# Patient Record
Sex: MALE | Race: WHITE | NOT HISPANIC OR LATINO | URBAN - METROPOLITAN AREA
[De-identification: names, ages, dates, MRNs, and addresses within clinical notes are randomized per-mention and may not be internally consistent; named-entity substitution may affect disease eponyms.]

---

## 2024-05-19 ENCOUNTER — HOSPITAL ENCOUNTER (EMERGENCY)
Facility: HOSPITAL | Age: 74
Discharge: HOME | End: 2024-05-19
Attending: EMERGENCY MEDICINE
Payer: OTHER GOVERNMENT

## 2024-05-19 VITALS
HEIGHT: 69 IN | HEART RATE: 84 BPM | BODY MASS INDEX: 28.14 KG/M2 | TEMPERATURE: 98 F | OXYGEN SATURATION: 95 % | DIASTOLIC BLOOD PRESSURE: 82 MMHG | WEIGHT: 190 LBS | RESPIRATION RATE: 20 BRPM | SYSTOLIC BLOOD PRESSURE: 156 MMHG

## 2024-05-19 DIAGNOSIS — M54.2 NECK PAIN: Primary | ICD-10-CM

## 2024-05-19 PROCEDURE — 99285 EMERGENCY DEPT VISIT HI MDM: CPT | Mod: 25

## 2024-05-19 PROCEDURE — 25000003 PHARM REV CODE 250: Performed by: EMERGENCY MEDICINE

## 2024-05-19 RX ORDER — MELOXICAM 15 MG/1
15 TABLET ORAL DAILY
Qty: 30 TABLET | Refills: 0 | Status: SHIPPED | OUTPATIENT
Start: 2024-05-19

## 2024-05-19 RX ORDER — METHOCARBAMOL 500 MG/1
1000 TABLET, FILM COATED ORAL 3 TIMES DAILY
Qty: 30 TABLET | Refills: 0 | Status: SHIPPED | OUTPATIENT
Start: 2024-05-19 | End: 2024-05-24

## 2024-05-19 RX ORDER — HYDROCODONE BITARTRATE AND ACETAMINOPHEN 5; 325 MG/1; MG/1
1 TABLET ORAL
Status: COMPLETED | OUTPATIENT
Start: 2024-05-19 | End: 2024-05-19

## 2024-05-19 RX ORDER — HYDROCODONE BITARTRATE AND ACETAMINOPHEN 5; 325 MG/1; MG/1
1 TABLET ORAL EVERY 6 HOURS PRN
Qty: 12 TABLET | Refills: 0 | Status: SHIPPED | OUTPATIENT
Start: 2024-05-19

## 2024-05-19 RX ADMIN — HYDROCODONE BITARTRATE AND ACETAMINOPHEN 1 TABLET: 5; 325 TABLET ORAL at 09:05

## 2024-05-19 NOTE — ED NOTES
"Pt identifiers checked and accurate with Marito Olivarez     Pt presents to ED with complaints of neck pain after feeling "pop" yesterday. Pt reports difficulty moving neck. Pt denies all other symptoms at this time     "

## 2024-05-19 NOTE — ED PROVIDER NOTES
"Encounter Date: 5/19/2024       History     Chief Complaint   Patient presents with    Neck Pain     Felt " pop " in neck yesterday     HPI patient is a very pleasant 73-year-old man who presents emergency department complaining of severe midline neck pain and unable to turn his neck since waking up at approximately 3:00 a.m. this morning.  Denies any associated numbness, weakness, difficulty urinating or defecating.  He reports hitting a pothole and coughing at the same time yesterday and feeling a pop in the center of his neck followed by a brief episode of pain that resolved until this morning when he woke up.  Review of patient's allergies indicates:  No Known Allergies  No past medical history on file.  No past surgical history on file.  No family history on file.     Review of Systems   Constitutional:  Negative for fever.   Genitourinary:  Negative for difficulty urinating.   Musculoskeletal:  Positive for neck pain.   Neurological:  Negative for weakness and numbness.       Physical Exam     Initial Vitals [05/19/24 0812]   BP Pulse Resp Temp SpO2   (!) 151/73 85 18 98.2 °F (36.8 °C) 98 %      MAP       --         Physical Exam    Nursing note and vitals reviewed.  Constitutional: He appears well-developed and well-nourished.   HENT:   Head: Normocephalic and atraumatic.   Eyes: EOM are normal. Pupils are equal, round, and reactive to light.   Neck: Neck supple.       Pulmonary/Chest: No respiratory distress.   Musculoskeletal:      Cervical back: Neck supple. Spinous process tenderness present. No muscular tenderness. Decreased range of motion.     Neurological: He is alert and oriented to person, place, and time. He has normal strength. No cranial nerve deficit or sensory deficit. GCS score is 15. GCS eye subscore is 4. GCS verbal subscore is 5. GCS motor subscore is 6.   Skin: Skin is warm and dry.         ED Course   Procedures  Labs Reviewed - No data to display       Imaging Results              CT " Cervical Spine Without Contrast (Final result)  Result time 05/19/24 09:56:48      Final result by Ramón Caraballo MD (05/19/24 09:56:48)                   Impression:      Mild-to-moderate multilevel degenerative disc disease resulting in mild central spinal canal stenosis with bilateral neural foraminal narrowing.    Pannus formation at the atlantoaxial articulation which can be seen with rheumatoid arthritis.    As deemed clinically necessary, further evaluation may be obtained with a nonemergent outpatient MRI of the cervical spine.      Electronically signed by: Ramón Caraballo  Date:    05/19/2024  Time:    09:56               Narrative:    EXAMINATION:  CT CERVICAL SPINE WITHOUT CONTRAST    CLINICAL HISTORY:  Neck pain, acute, no red flags;    TECHNIQUE:  Low dose axial images, sagittal and coronal reformations were performed though the cervical spine.  Contrast was not administered.    COMPARISON:  None    FINDINGS:  There is straightening the normal cervical lordosis.  Cervical vertebral bodies otherwise normal in height and alignment.  No acute fracture or subluxation.  No significant prevertebral soft tissue swelling.  There are small bony erosions of the odontoid with adjacent soft tissue thickening and soft tissue calcification at the atlantoaxial articulation.    There is mild to moderate multilevel degenerative disc disease most pronounced from C4 through C7 resulting in mild central spinal canal stenosis with mild to moderate bilateral neural foraminal narrowing.    Visualized lung apices are clear.                                       Medications   HYDROcodone-acetaminophen 5-325 mg per tablet 1 tablet (1 tablet Oral Given 5/19/24 0940)     Medical Decision Making  73-year-old man with neck pain after hitting a pothole and coughing at the same time yesterday.  Initially symptoms were short-lived but then returned in the middle of the night and now is having pain with movement of his neck.  Does  have some midline tenderness on examination without any step-off or deformity.  Differential diagnosis includes neck strain, fracture, subluxation/dislocation.  Patient has normal neuro exam, I doubt any significant cord compression syndrome.  CT of the cervical spine without contrast was obtained showing mild-to-moderate multilevel degenerative disc disease with mild central canal spinal stenosis and bilateral neural foraminal narrowing.  Patient will be treated with muscle relaxants, NSAIDs, pain medicine.  Will avoid steroids given his history of diabetes.  I doubt epidural or spinal abscess.  No evidence of fracture or dislocation.  PCP follow-up.  Return precautions discussed.  Discharged in no acute distress.    Amount and/or Complexity of Data Reviewed  Radiology: ordered.    Risk  Prescription drug management.                                      Clinical Impression:  Final diagnoses:  [M54.2] Neck pain (Primary)          ED Disposition Condition    Discharge Stable          ED Prescriptions       Medication Sig Dispense Start Date End Date Auth. Provider    methocarbamoL (ROBAXIN) 500 MG Tab Take 2 tablets (1,000 mg total) by mouth 3 (three) times daily. for 5 days 30 tablet 5/19/2024 5/24/2024 Gonzalo Maciel MD    meloxicam (MOBIC) 15 MG tablet Take 1 tablet (15 mg total) by mouth once daily. 30 tablet 5/19/2024 -- Gonzalo Maciel MD    HYDROcodone-acetaminophen (NORCO) 5-325 mg per tablet Take 1 tablet by mouth every 6 (six) hours as needed for Pain. 12 tablet 5/19/2024 -- Gonzalo Maciel MD          Follow-up Information       Follow up With Specialties Details Why Contact Info Additional Information    [facility] -  Emergency Medicine  As needed, If symptoms worsen [address] [place] [address] [address] [place] [place] [address] 1st floor    Your Primary care upon return to [place]                  Gonzalo Maciel MD  05/19/24 2967